# Patient Record
Sex: FEMALE | Race: WHITE | NOT HISPANIC OR LATINO | Employment: UNEMPLOYED | URBAN - METROPOLITAN AREA
[De-identification: names, ages, dates, MRNs, and addresses within clinical notes are randomized per-mention and may not be internally consistent; named-entity substitution may affect disease eponyms.]

---

## 2022-09-29 ENCOUNTER — TRANSFERRED RECORDS (OUTPATIENT)
Dept: HEALTH INFORMATION MANAGEMENT | Facility: CLINIC | Age: 52
End: 2022-09-29

## 2022-10-13 ENCOUNTER — TRANSFERRED RECORDS (OUTPATIENT)
Dept: HEALTH INFORMATION MANAGEMENT | Facility: CLINIC | Age: 52
End: 2022-10-13

## 2022-10-21 ENCOUNTER — TRANSFERRED RECORDS (OUTPATIENT)
Dept: HEALTH INFORMATION MANAGEMENT | Facility: CLINIC | Age: 52
End: 2022-10-21

## 2022-10-24 ENCOUNTER — TRANSFERRED RECORDS (OUTPATIENT)
Dept: HEALTH INFORMATION MANAGEMENT | Facility: CLINIC | Age: 52
End: 2022-10-24

## 2022-11-07 ENCOUNTER — TRANSFERRED RECORDS (OUTPATIENT)
Dept: HEALTH INFORMATION MANAGEMENT | Facility: CLINIC | Age: 52
End: 2022-11-07

## 2023-07-15 ENCOUNTER — HEALTH MAINTENANCE LETTER (OUTPATIENT)
Age: 53
End: 2023-07-15

## 2023-11-28 ENCOUNTER — TRANSFERRED RECORDS (OUTPATIENT)
Dept: HEALTH INFORMATION MANAGEMENT | Facility: CLINIC | Age: 53
End: 2023-11-28

## 2023-12-06 ENCOUNTER — TRANSFERRED RECORDS (OUTPATIENT)
Dept: HEALTH INFORMATION MANAGEMENT | Facility: CLINIC | Age: 53
End: 2023-12-06

## 2023-12-19 ENCOUNTER — TRANSFERRED RECORDS (OUTPATIENT)
Dept: HEALTH INFORMATION MANAGEMENT | Facility: CLINIC | Age: 53
End: 2023-12-19

## 2023-12-27 ENCOUNTER — TRANSCRIBE ORDERS (OUTPATIENT)
Dept: OTHER | Age: 53
End: 2023-12-27

## 2023-12-27 DIAGNOSIS — E55.9 VITAMIN D DEFICIENCY: ICD-10-CM

## 2023-12-27 DIAGNOSIS — D64.9 CHRONIC ANEMIA: Primary | ICD-10-CM

## 2023-12-29 ENCOUNTER — PATIENT OUTREACH (OUTPATIENT)
Dept: ONCOLOGY | Facility: CLINIC | Age: 53
End: 2023-12-29
Payer: COMMERCIAL

## 2023-12-29 NOTE — PROGRESS NOTES
Hematology referral reviewed for [facility], see below.    Referral reason: iron deficiency refractory to oral iron    Clinical question entered by referring provider or through order transcription: fax referral    Referral received via: Fax referral from [facility]    Current abnormal labs: Available in Media tab - referred by [facility]    Outreach: Call not placed to patient regarding referral.    Plan: Triage instructions updated and sent to NPS for completion.

## 2024-01-17 ENCOUNTER — VIRTUAL VISIT (OUTPATIENT)
Dept: ENDOCRINOLOGY | Facility: CLINIC | Age: 54
End: 2024-01-17
Payer: COMMERCIAL

## 2024-01-17 ENCOUNTER — TELEPHONE (OUTPATIENT)
Dept: ENDOCRINOLOGY | Facility: CLINIC | Age: 54
End: 2024-01-17

## 2024-01-17 VITALS — BODY MASS INDEX: 38.98 KG/M2 | HEIGHT: 63 IN | WEIGHT: 220 LBS

## 2024-01-17 DIAGNOSIS — Z90.3 STATUS POST SLEEVE GASTRECTOMY: ICD-10-CM

## 2024-01-17 DIAGNOSIS — E66.01 CLASS 2 SEVERE OBESITY WITH SERIOUS COMORBIDITY AND BODY MASS INDEX (BMI) OF 38.0 TO 38.9 IN ADULT, UNSPECIFIED OBESITY TYPE (H): Primary | ICD-10-CM

## 2024-01-17 DIAGNOSIS — E66.01 CLASS 2 SEVERE OBESITY WITH SERIOUS COMORBIDITY AND BODY MASS INDEX (BMI) OF 38.0 TO 38.9 IN ADULT, UNSPECIFIED OBESITY TYPE (H): ICD-10-CM

## 2024-01-17 DIAGNOSIS — E66.812 CLASS 2 SEVERE OBESITY WITH SERIOUS COMORBIDITY AND BODY MASS INDEX (BMI) OF 38.0 TO 38.9 IN ADULT, UNSPECIFIED OBESITY TYPE (H): Primary | ICD-10-CM

## 2024-01-17 DIAGNOSIS — Z71.3 NUTRITIONAL COUNSELING: Primary | ICD-10-CM

## 2024-01-17 DIAGNOSIS — R73.03 PREDIABETES: ICD-10-CM

## 2024-01-17 DIAGNOSIS — E66.812 CLASS 2 SEVERE OBESITY WITH SERIOUS COMORBIDITY AND BODY MASS INDEX (BMI) OF 38.0 TO 38.9 IN ADULT, UNSPECIFIED OBESITY TYPE (H): ICD-10-CM

## 2024-01-17 PROBLEM — R93.5 ABNORMAL ULTRASOUND OF ENDOMETRIUM: Status: ACTIVE | Noted: 2022-10-31

## 2024-01-17 PROBLEM — M18.12 ARTHRITIS OF CARPOMETACARPAL (CMC) JOINT OF LEFT THUMB: Status: ACTIVE | Noted: 2019-11-18

## 2024-01-17 PROBLEM — Z15.02 BRCA2 GENE MUTATION POSITIVE IN FEMALE: Status: ACTIVE | Noted: 2023-09-16

## 2024-01-17 PROBLEM — F43.23 ADJUSTMENT DISORDER WITH MIXED ANXIETY AND DEPRESSED MOOD: Status: ACTIVE | Noted: 2022-02-12

## 2024-01-17 PROBLEM — K44.9 HIATAL HERNIA: Status: ACTIVE | Noted: 2022-01-16

## 2024-01-17 PROBLEM — K80.00 GALLSTONES AND INFLAMMATION OF GALLBLADDER WITHOUT OBSTRUCTION: Status: ACTIVE | Noted: 2022-01-16

## 2024-01-17 PROBLEM — M72.0 DUPUYTREN'S DISEASE OF PALM OF RIGHT HAND: Status: ACTIVE | Noted: 2019-11-18

## 2024-01-17 PROBLEM — Z15.01 BRCA2 GENE MUTATION POSITIVE IN FEMALE: Status: ACTIVE | Noted: 2023-09-16

## 2024-01-17 PROBLEM — Z15.09 BRCA2 GENE MUTATION POSITIVE IN FEMALE: Status: ACTIVE | Noted: 2023-09-16

## 2024-01-17 PROCEDURE — 99205 OFFICE O/P NEW HI 60 MIN: CPT | Mod: 95

## 2024-01-17 PROCEDURE — 99417 PROLNG OP E/M EACH 15 MIN: CPT | Mod: 95

## 2024-01-17 PROCEDURE — 97802 MEDICAL NUTRITION INDIV IN: CPT | Mod: 95 | Performed by: DIETITIAN, REGISTERED

## 2024-01-17 PROCEDURE — 99207 PR NO CHARGE LOS: CPT | Mod: 95 | Performed by: DIETITIAN, REGISTERED

## 2024-01-17 RX ORDER — DEXTROAMPHETAMINE SACCHARATE, AMPHETAMINE ASPARTATE, DEXTROAMPHETAMINE SULFATE AND AMPHETAMINE SULFATE 2.5; 2.5; 2.5; 2.5 MG/1; MG/1; MG/1; MG/1
TABLET ORAL
COMMUNITY
Start: 2022-01-16

## 2024-01-17 RX ORDER — MULTIVITAMIN WITH IRON
1 TABLET ORAL DAILY
COMMUNITY

## 2024-01-17 RX ORDER — DESVENLAFAXINE 50 MG/1
100 TABLET, FILM COATED, EXTENDED RELEASE ORAL
COMMUNITY
End: 2024-01-17

## 2024-01-17 RX ORDER — FERROUS SULFATE 325(65) MG
325 TABLET, DELAYED RELEASE (ENTERIC COATED) ORAL
COMMUNITY
Start: 2022-12-13

## 2024-01-17 RX ORDER — DEXTROAMPHETAMINE SACCHARATE, AMPHETAMINE ASPARTATE, DEXTROAMPHETAMINE SULFATE AND AMPHETAMINE SULFATE 5; 5; 5; 5 MG/1; MG/1; MG/1; MG/1
10 TABLET ORAL
COMMUNITY
Start: 2022-10-17 | End: 2024-01-17

## 2024-01-17 RX ORDER — GABAPENTIN 100 MG/1
100 CAPSULE ORAL
COMMUNITY
Start: 2022-10-18 | End: 2024-01-17

## 2024-01-17 RX ORDER — DESVENLAFAXINE 100 MG/1
TABLET, EXTENDED RELEASE ORAL
COMMUNITY
Start: 2014-01-16

## 2024-01-17 RX ORDER — BUPROPION HYDROCHLORIDE 300 MG/1
300 TABLET ORAL DAILY
COMMUNITY
End: 2024-01-17

## 2024-01-17 RX ORDER — CETIRIZINE HYDROCHLORIDE 10 MG/1
TABLET ORAL
COMMUNITY
Start: 2004-01-16

## 2024-01-17 RX ORDER — CHOLECALCIFEROL (VITAMIN D3) 50 MCG
TABLET ORAL
COMMUNITY
End: 2024-01-17

## 2024-01-17 RX ORDER — BUPROPION HYDROCHLORIDE 100 MG/1
TABLET, EXTENDED RELEASE ORAL
COMMUNITY

## 2024-01-17 RX ORDER — ACETAMINOPHEN 500 MG
TABLET ORAL
COMMUNITY
Start: 2022-10-31

## 2024-01-17 RX ORDER — ESTRADIOL/NORETHINDRONE ACETATE TRANSDERMAL SYSTEM .05; .14 MG/D; MG/D
1 PATCH, EXTENDED RELEASE TRANSDERMAL
COMMUNITY
Start: 2022-09-16

## 2024-01-17 RX ORDER — HYDROXYZINE HYDROCHLORIDE 25 MG/1
TABLET, FILM COATED ORAL
COMMUNITY
Start: 2022-08-09 | End: 2024-01-17

## 2024-01-17 RX ORDER — VALACYCLOVIR HYDROCHLORIDE 500 MG/1
TABLET, FILM COATED ORAL
COMMUNITY
Start: 2022-08-26

## 2024-01-17 RX ORDER — VITAMIN B COMPLEX
TABLET ORAL
COMMUNITY
Start: 2024-01-16

## 2024-01-17 RX ORDER — BUPROPION HYDROCHLORIDE 100 MG/1
TABLET ORAL
COMMUNITY
Start: 2002-01-16 | End: 2024-01-17

## 2024-01-17 ASSESSMENT — PAIN SCALES - GENERAL: PAINLEVEL: NO PAIN (0)

## 2024-01-17 NOTE — PROGRESS NOTES
"Video-Visit Details    Type of service:  Video Visit    Video Start Time: 12:54 pm   Video End Time: 1:30 pm    Originating Location (pt. Location): Home    Distant Location (provider location):  Offsite (providers home)     Platform used for Video Visit: Semetric      Nutrition Assessment  Reason For Visit:  Angie Sandoval is a 53 year old female presents today for new re-establish nutrition visit. Pt with history of sleeve gastrectomy in 2011.    Referred by Corrine Nugent PA-C on 1/17/24.     Anthropometrics:  Lowest weight post op: 160 lbs  Weight 1/17/24: 220 lbs     Estimated body mass index is 38.97 kg/m  as calculated from the following:    Height as of this encounter: 1.6 m (5' 3\").    Weight as of this encounter: 99.8 kg (220 lb).    Current Vitamins/Minerals:   Vit D3 4,000 international unit(s)/day   Vitamin blend (did testing through them per pt) - has 315 mcg   Mg  Biotin  Calcium - needs to get re-filled  Ginkgo Biloba     Hx of iron def anemia - upcoming appt with hematologist.  Not able to see iron labs (labs done 12/20/23 through [facility] per pt)     Medications for weight loss:  Metformin - dose increased today with Corrine    Nutrition History:  Working on figuring out allergies - has had skin/blood tests.   Cannot tolerate eggs/ onions/garlic/strawberries/whey/soy generally speaking per pt - does almond milk. Can eat cheese.   Can tolerate chocolate within reason (pending on allergens in air)  Avoid tomatoes due to reflux.  Rice/pasta/bread do not sit well since surgery.    Pt with hx of sleeve gastrectomy in 2011. Re-establishing care today.  Concerns per pt \"Weight gain 40 pounds in 6 months & cant eliminate it, also developed pre-diabetes witn 5.7 A1C, considering gall bladder removal due to inflammed, hiatal hernia developed post surgery making gastruc reflux bad and on meds for that.\"    Pt goals: improve energy    Typically eats 3x/day  Example meals: packet of oatmeal with blueberries, soup, " 1/2 cheeseburger with handful of sweet potato fries, 2 slices veggie cauliflower pizza  Snacks - protein balls, herbal life protein shake    Was snacking on chips/sweets but working on cutting back (limited by braces). Has a sweet tooth per pt.    Can't sit down and eat full meal - takes about 60 min to eat 1/2 portions family eats.    Typically good at chewing - harder right now due to recent braces.    Hydration: sweetened ice latee from [employer], water, propel diet soda   - Gets maybe 1 water bottle/day currently (12-16 oz)   - Separates fluids from meal time (Feels she has to)    Alcohol: None    PA: very active in [place] (Works in [employer])    Nutrition Prescription:  Grams Protein: 50-60 (minimum)  Amount of Fluid: 48-64 oz    Nutrition Diagnosis  Obesity r/t long history of positive energy balance aeb BMI >30.    Intervention  Materials/Education provided on maintenance dietary guidelines after bariatric surgery, portion sizes, eating pace and chewing well, snacking, separation of beverages from meals, vitamin and mineral supplements after weight loss surgery, and protein needs. Discussed importance of physical activity. Patient demonstrates understanding. Provided pt with list of goals and RD contact information.    Expected Engagement: good    Goals:  Could consider Nicholas coffee concentrate as an alternative to latte   Recommend 20-30 g of protein/meal   Consider gym membership   Aim for 3-4 water bottles/day     Some protein ideas:   - Quinoa   - [place] products (oatmeal, waffles/pancakes)   - Seeds   - Nuts   - Nut butter   - Beans   - Protein shake     Nicholas coffee: [email or web address]     Resources     Why Take Supplements for Life after Weight Loss Surgery  [email or web address]     Supplements after Sleeve Gastrectomy  [email or web address]    Keeping Up Your Diet after Weight Loss  Surgery  [email or web address]    Preventing Low Blood Sugar after Weight Loss Surgery  [email or web address]     Preventing Dumping Syndrome after Weight Loss Surgery  [email or web address]     Follow-Up:  Friday, March 15th at 8:00 am    Time spent with patient: 36 minutes.  SHALINI Sharma, RD, LD

## 2024-01-17 NOTE — NURSING NOTE
"Chief Complaint   Patient presents with    Follow Up     New re-establish consultation for weight management.         Vitals:    01/17/24 1038   Weight: 220 lb   Height: 5' 3\"       Body mass index is 38.97 kg/m .      Kylee Waite, [facility]  Surgery Clinic                      "

## 2024-01-17 NOTE — PROGRESS NOTES
Virtual Visit Check-In    During this virtual visit the patient is located in [place], patient verifies this as the location during the entirety of this visit.     Angie is a 53 year old who is being evaluated via a billable video visit.      How would you like to obtain your AVS? MyChart  If the video visit is dropped, the invitation should be resent by: Text to cell phone: [phone]  Will anyone else be joining your video visit? No        Video-Visit Details    Type of service:  Video Visit     Originating Location (pt. Location): Home    Distant Location (provider location):  Off-site  Platform used for Video Visit: NatureWorks         Video Start Time:  10:40am  Video End Time: 11:50am    Kyele Zuniga EMT

## 2024-01-17 NOTE — TELEPHONE ENCOUNTER
Request sent to [facility] in [place] to all labs and Images done in December 2023 per Christina NICHOLS Request- SA 1/17/24

## 2024-01-17 NOTE — NURSING NOTE
Is the patient currently in the state of [place]? YES    Visit mode:VIDEO    If the visit is dropped, the patient can be reconnected by: TELEPHONE VISIT: Phone number:   Telephone Information:   Mobile [phone]       Will anyone else be joining the visit? NO  (If patient encounters technical issues they should call [phone])    How would you like to obtain your AVS? MyChart    Are changes needed to the allergy or medication list? No    Reason for visit: Consult    Kennedy CLEMONS

## 2024-01-17 NOTE — LETTER
2024       RE: Angie Sandoval  [address]  [place] [facility] [address]     Dear Colleague,    Thank you for referring your patient, Angie Sandoval, to the [facility] WEIGHT MANAGEMENT [facility]IC [place] at [facility]. Please see a copy of my visit note below.    New re-establish Care Post Bariatric Surgery Note    RE: Angie Sandoval  MR#: [id number]  : 1970  VISIT DATE: 2024      Dear No primary care provider on file.,    I had the pleasure of seeing your patient, Angie Sandoval, in my post-bariatric surgery assessment clinic.    Assessment & Plan  Problem List Items Addressed This Visit       Obesity, unspecified - Primary    Relevant Medications    amphetamine-dextroamphetamine (ADDERALL) 10 MG tablet    metFORMIN (GLUCOPHAGE) 500 MG tablet     Other Visit Diagnoses       Status post sleeve gastrectomy        Prediabetes        Relevant Medications    metFORMIN (GLUCOPHAGE) 500 MG tablet               123 minutes spent by me on the date of the encounter doing chart review, history and exam, documentation and further activities per the note    CHIEF COMPLAINT: Post-bariatric surgery follow-up. 13 years s/p sleeve gastrectomy with S[facility]chez Cohen at [facility].    HISTORY OF PRESENT ILLNESS:      2024    10:10 PM   Questions Regarding Prior Weight Loss Surgery Reviewed With Patient   I had the following weight loss procedure Sleeve Gastrectomy   What year was your surgery?    How has your weight changed since your last visit? I have gained weight   Do you currently have any of the following Heartburn, acid reflux, or GERD (acid reflux disease)?   Do you have any concerns today? Weight gain 40 pounds in 6 months & cant eliminate it, also developed pre-diabetes witn 5.7 A1C, considering gall bladder removal due to inflammed, hiatal hernia developed post surgery making gastruc reflux bad and on meds for that.   Overweight onset in 20s, was fluctuating  between 140 and 190. Further weight gain in 30s with pregnancies, after pregnancies weighed 236lbs. Underwent vertical sleeve gastrectomy in 2011, michael weight following surgery was 160, was very comfortable between 160 and 170 for 11 years. Last year gained 40 lbs months over 6 with extensive stress with daughter being hospitalized due to suicide attempt. Daughter is now doing better, still is experiencing stress with kids and ex-. Took gabapentin last year due to stress, was also diagnosed with iron deficiency anemia, has upcoming appointment with a hematologist . Iron labs are not available currently on chart (per patient labs were done 12/20/2023 through [facility]).  Has since discontinued gabapentin.     [facility] care through [facility] with Dr. Abdalla.     Has heart burn that is well managed symptomatically with 20mg omeprazole daily. Diagnosed with GERD and determined to be high risk for developing barrets esophagus, followed by [facility]. 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 Also has hiatal hernia.     Comorbidities associated with weight gain include GERD, prediabetes, low energy.       Regarding eating patterns and diet, she typically  eats 3 small meals and snacks throughout the day. Doesn't experience cravings. Will feel bloated after eating too much, but does not feel the same feeling of fullness since the getting the gastric sleeve.     Eats out/ gets take out every day, is working on reducing this. Has been doing this lately due to lack of time and motivation to cook for boyfriend 2 teenagers and an exchange student, her kids generally don't all want the same meal so getting take out is a convenient solution to this. Drinks sweetened ice latte from [employer] every morning, the rest of the day drinks diet soda, water. Doesn't drink alcohol.     Breakfast: bowl of oatmeal with blueberries. Sweetened ice latte from [employer],  would be open to switching to regular latte or capucino.    Lunch: soup   Dinner: 1/2 cheese burger, sweet potato fries   Snack: protein balls, herbalife protein shake,     Regarding activity, she is mostly sedentary during the winter. Cleans her house daily. In summer is busy with running pop-up garden centers and is on her feet throughout the day for this, found this to be great exercise. Is interested in joining a gym, used to love going to the gym, working out, lifting weights.        Past/ Current AOMs   Wellbutrin 100mg 3x daily, has been taking this medication for the last 20 years, has not seen weight loss from it.   Metformin 500mg started 2 weeks ago through primary care, tolerates this medication, maybe has noticed a small headache since starting it.      She is hesitant with starting a new weight loss medication today as she feels that she is still feeling appetite and hunger control with her gastric sleeve. She is open to increasing her metformin today, but otherwise would prefer to focus on adjusting diet and lifestyle prior to pursuing additional weight loss medication options.     Can consider zepbound or other GLP-1s (caution would be needed due to pancreatic duct obstruction), phentermine (no history of HTN, heart problems, caution needed due to anxiety), or adding naltrexone for synergistic effect with wellbutrin. Extra caution would be needed with topiramate due to her having a kidney stone as well as her concern for brain fog.    Plan  Increase metformin to 1g daily for blood sugar and assistance with weight management   Meet with hematologist as planned to evaluate for concerns regarding chronic anemia, management of this may help improve energy levels   Goals we discussed today: switching to a coffee or latte in with no added flavoring, increasing daily protein, goal is 20-30g per meal (60-90g per day), getting a gym membership   Labs ordered today: post bariatric surgery labs that were not  completed at [facility] (labs uploaded to patient's chart). 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  PHENTERMINE  No history of hypertension  No history of CVA   No history of cardiovascular disease   No history of cardiac arrhythmias   No history of glaucoma  Caution would be needed with this medication due to anxiety  .  TOPIRAMATE  No history of glaucoma   No issues with memory  No chronic kidney disease   Caution would be needed with this medication due to current kidney stone and concern for brain fog   .  NALTREXONE  No history of liver disease  No chronic pain   No current opioid use   May see synergistic effect when taking this alongside her wellbutrin  .            Weight History:      1/16/2024    10:10 PM   --   What is your highest lifetime weight? 236   What is your lowest weight since surgery? (In pounds) 160     Initial Weight (lbs): 220 lbs  Weight: 99.8 kg (220 lb)     Cumulative weight loss (lbs): 0  Weight Loss Percentage: 0%        1/16/2024    10:10 PM   Questions Regarding Co-Morbidities and Health Concerns Reviewed With Patient   Pre-diabetes Worsened   Diabetes II Never   High Blood Pressure Never   High cholesterol Never   Heartburn/Reflux Worsened   Sleep apnea Gone away   PCOS Never   Back pain Never   Joint pain Never   Lower leg swelling Worsened           1/16/2024    10:10 PM   Eating Habits   How many meals do you eat per day? 4   Do you snack between meals? Sometimes   How much food are you eating at  each meal? 1/2 cup to 1 cup   Are you able to separate your meals and liquids by at least 30 minutes? Sometimes   Are you able to avoid liquid calories? Sometimes           1/16/2024    10:10 PM   Exercise Questions Reviewed With Patient   How often do you exercise? 1 to 2 times per week   What is the duration of your exercise (in minutes)? 30 Minutes   What types of exercise do you do? walking    climbing stairs at work   What keeps you from being more active? I should be more active but I just have not gotten around to it    Too tired       Social History:      1/16/2024    10:10 PM   --   Are you smoking? No   Are you drinking alcohol? No       Medications:  Current Outpatient Medications   Medication    acetaminophen (TYLENOL) 500 MG tablet    amphetamine-dextroamphetamine (ADDERALL) 10 MG tablet    buPROPion (WELLBUTRIN SR) 100 MG 12 hr tablet    cetirizine (ZYRTEC) 10 MG tablet    desvenlafaxine (PRISTIQ) 100 MG 24 hr tablet    EPINEPHrine (SYMJEPI) 0.3 MG/0.3ML    estradiol-norethindrone (COMBIPATCH) 0.05-0.14 MG/DAY bi-weekly patch    ferrous sulfate (FE TABS) 325 (65 Fe) MG EC tablet    lipase-protease-amylase (CREON) 48945-68922-015029 units CPEP per EC capsule    magnesium 250 MG tablet    metFORMIN (GLUCOPHAGE) 500 MG tablet    omeprazole (PRILOSEC) 20 MG DR capsule    valACYclovir (VALTREX) 500 MG tablet    Vitamin D3 (CHOLECALCIFEROL) 25 mcg (1000 units) tablet     No current facility-administered medications for this visit.         1/16/2024    10:10 PM   --   Do you avoid NSAIDs such as (Ibuprofen, Aleve, Naproxen, Advil)? No       ROS:  GI:       1/16/2024    10:10 PM   --   Vomiting No   Diarrhea No   Constipation Yes   Swallowing trouble No   Abdominal pain Yes   Heartburn Yes     Skin:       1/16/2024    10:10 PM   BAR RBS ROS - SKIN   Rash in skin folds No     Psych:       1/16/2024    10:10 PM   --   Depression Yes   Anxiety Yes     Female Only:       1/16/2024    10:10 PM   BAR RBS ROS -   "  Female only Post-menopausal   Stress urinary incontinence No                No data to display                Anti-obesity medication ROS:    HEENT  Hx of glaucoma: No    Cardiovascular  CAD:No  HTN:No      Gastrointestinal  GERD:Yes  Constipation:Yes, with taking iron supplements has been improved with taking 250mg magnesium daily . Bowel movements daily, well formed.   Liver Dz:No  H/O Pancreatitis:No Has pancreatic duct blocked.     Psychiatric  Bipolar: No  Anxiety:Yes  Depression:Yes  History of alcohol/drug abuse: No  Hx of eating disorder:No    Endocrine  Personal or family hx of MTC or MEN2:No  Diabetes/prediabetes: Yes Last A1c 5.7    Neurologic:  Hx of seizures: No  Hx of migraines: Yes seldom, last migraine spring 2023. Gets them 1-2 times a year.   Memory Impairment: Yes Has seen brain fog with age.   CVA history: No        History of kidney stones: Yes patient reports seen incidentally on CT scan December 2023  Kidney disease: No  Current birth control: No    Taking Opioid/Narcotic: No    PHYSICAL EXAM:  Objective   Ht 1.6 m (5' 3\")   Wt 99.8 kg (220 lb)   BMI 38.97 kg/m           Vitals:  No vitals were obtained today due to virtual visit.    Physical Exam   GENERAL: alert and no distress  EYES: Eyes grossly normal to inspection.  No discharge or erythema, or obvious scleral/conjunctival abnormalities.  RESP: No audible wheeze, cough, or visible cyanosis.    SKIN: Visible skin clear. No significant rash, abnormal pigmentation or lesions.  NEURO: Cranial nerves grossly intact.  Mentation and speech appropriate for age.  PSYCH: Appropriate affect, tone, and pace of words        Sincerely,    Corrine Begum PA-C      Virtual Visit Check-In    During this virtual visit the patient is located in [place], patient verifies this as the location during the entirety of this visit.     Angie is a 53 year old who is being evaluated via a billable video visit.      How would you like to obtain your AVS? " Sydni  If the video visit is dropped, the invitation should be resent by: Text to cell phone: [phone]  Will anyone else be joining your video visit? No        Video-Visit Details    Type of service:  Video Visit     Originating Location (pt. Location): Home    Distant Location (provider location):  Off-site  Platform used for Video Visit: Gen         Video Start Time:  10:40am  Video End Time: 11:50am    Kylee Zuniga EMT

## 2024-01-17 NOTE — LETTER
"1/17/2024       RE: Angie Sandoval  [address]  [place] [address]     Dear Colleague,    Thank you for referring your patient, Angie Sandoval, to the [facility] WEIGHT MANAGEMENT CLINIC [place] at [facility]. Please see a copy of my visit note below.    Video-Visit Details    Type of service:  Video Visit    Video Start Time: 12:54 pm   Video End Time: 1:30 pm    Originating Location (pt. Location): Home    Distant Location (provider location):  Offsite (providers home)     Platform used for Video Visit: [email or web address]      Nutrition Assessment  Reason For Visit:  Angie Sandoval is a 53 year old female presents today for new re-establish nutrition visit. Pt with history of sleeve gastrectomy in 2011.    Referred by Corrine Nugent PA-C on 1/17/24.     Anthropometrics:  Lowest weight post op: 160 lbs  Weight 1/17/24: 220 lbs     Estimated body mass index is 38.97 kg/m  as calculated from the following:    Height as of this encounter: 1.6 m (5' 3\").    Weight as of this encounter: 99.8 kg (220 lb).    Current Vitamins/Minerals:   Vit D3 4,000 international unit(s)/day   Vitamin blend (did testing through them per pt) - has 315 mcg   Mg  Biotin  Calcium - needs to get re-filled  Ginkgo Biloba     Hx of iron def anemia - upcoming appt with hematologist.  Not able to see iron labs (labs done 12/20/23 through [facility] per pt)     Medications for weight loss:  Metformin - dose increased today with Corrine    Nutrition History:  Working on figuring out allergies - has had skin/blood tests.   Cannot tolerate eggs/ onions/garlic/strawberries/whey/soy generally speaking per pt - does almond milk. Can eat cheese.   Can tolerate chocolate within reason (pending on allergens in air)  Avoid tomatoes due to reflux.  Rice/pasta/bread do not sit well since surgery.    Pt with hx of sleeve gastrectomy in 2011. Re-establishing care today.  Concerns per pt \"Weight gain 40 pounds in 6 " "months & cant eliminate it, also developed pre-diabetes witn 5.7 A1C, considering gall bladder removal due to inflammed, hiatal hernia developed post surgery making gastruc reflux bad and on meds for that.\"    Pt goals: improve energy    Typically eats 3x/day  Example meals: packet of oatmeal with blueberries, soup, 1/2 cheeseburger with handful of sweet potato fries, 2 slices veggie cauliflower pizza  Snacks - protein balls, herbal life protein shake    Was snacking on chips/sweets but working on cutting back (limited by braces). Has a sweet tooth per pt.    Can't sit down and eat full meal - takes about 60 min to eat 1/2 portions family eats.    Typically good at chewing - harder right now due to recent braces.    Hydration: sweetened ice latee from [employer], water, propel diet soda   - Gets maybe 1 water bottle/day currently (12-16 oz)   - Separates fluids from meal time (Feels she has to)    Alcohol: None    PA: very active in [employer] (Works in [employer])    Nutrition Prescription:  Grams Protein: 50-60 (minimum)  Amount of Fluid: 48-64 oz    Nutrition Diagnosis  Obesity r/t long history of positive energy balance aeb BMI >30.    Intervention  Materials/Education provided on maintenance dietary guidelines after bariatric surgery, portion sizes, eating pace and chewing well, snacking, separation of beverages from meals, vitamin and mineral supplements after weight loss surgery, and protein needs. Discussed importance of physical activity. Patient demonstrates understanding. Provided pt with list of goals and RD contact information.    Expected Engagement: good    Goals:  Could consider Nicholas coffee concentrate as an alternative to latte   Recommend 20-30 g of protein/meal   Consider gym membership   Aim for 3-4 water bottles/day     Some protein ideas:   - Quinoa   - [place] products (oatmeal, waffles/pancakes)   - Seeds   - Nuts   - Nut butter   - Beans   - Protein shake     Nicholas coffee: " [email or web address]-vanilla-coffee-concentrate     Resources     Why Take Supplements for Life after Weight Loss Surgery  [email or web address]     Supplements after Sleeve Gastrectomy  [email or web address]    Keeping Up Your Diet after Weight Loss Surgery  [email or web address]    Preventing Low Blood Sugar after Weight Loss Surgery  [email or web address]     Preventing Dumping Syndrome after Weight Loss Surgery  [email or web address]     Follow-Up:  Friday, March 15th at 8:00 am    Time spent with patient: 36 minutes.  SHALINI Sharma, RD, LD

## 2024-01-17 NOTE — PROGRESS NOTES
New re-establish Care Post Bariatric Surgery Note    RE: Angie Sandoval  MR#: [id number]  : 1970  VISIT DATE: 2024      Dear No primary care provider on file.,    I had the pleasure of seeing your patient, Angie Sandoval, in my post-bariatric surgery assessment clinic.    Assessment & Plan   Problem List Items Addressed This Visit       Obesity, unspecified - Primary    Relevant Medications    amphetamine-dextroamphetamine (ADDERALL) 10 MG tablet    metFORMIN (GLUCOPHAGE) 500 MG tablet     Other Visit Diagnoses       Status post sleeve gastrectomy        Prediabetes        Relevant Medications    metFORMIN (GLUCOPHAGE) 500 MG tablet               123 minutes spent by me on the date of the encounter doing chart review, history and exam, documentation and further activities per the note    CHIEF COMPLAINT: Post-bariatric surgery follow-up. 13 years s/p sleeve gastrectomy with Sánchez Cohen at [facility].    HISTORY OF PRESENT ILLNESS:      2024    10:10 PM   Questions Regarding Prior Weight Loss Surgery Reviewed With Patient   I had the following weight loss procedure Sleeve Gastrectomy   What year was your surgery?    How has your weight changed since your last visit? I have gained weight   Do you currently have any of the following Heartburn, acid reflux, or GERD (acid reflux disease)?   Do you have any concerns today? Weight gain 40 pounds in 6 months & cant eliminate it, also developed pre-diabetes witn 5.7 A1C, considering gall bladder removal due to inflammed, hiatal hernia developed post surgery making gastruc reflux bad and on meds for that.   Overweight onset in 20s, was fluctuating between 140 and 190. Further weight gain in 30s with pregnancies, after pregnancies weighed 236lbs. Underwent vertical sleeve gastrectomy in , michael weight following surgery was 160, was very comfortable between 160 and 170 for 11 years. Last year gained 40 lbs months over 6 with extensive stress with daughter  being hospitalized due to suicide attempt. Daughter is now doing better, still is experiencing stress with kids and ex-. Took gabapentin last year due to stress, was also diagnosed with iron deficiency anemia, has upcoming appointment with a hematologist . Iron labs are not available currently on chart (per patient labs were done 12/20/2023 through [facility]).  Has since discontinued gabapentin.     Primary care through [facility] with Dr. Abdalla.     Has heart burn that is well managed symptomatically with 20mg omeprazole daily. Diagnosed with GERD and determined to be high risk for developing barrets esophagus, followed by [facility]. 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 Also has hiatal hernia.     Comorbidities associated with weight gain include GERD, prediabetes, low energy.       Regarding eating patterns and diet, she typically  eats 3 small meals and snacks throughout the day. Doesn't experience cravings. Will feel bloated after eating too much, but does not feel the same feeling of fullness since the getting the gastric sleeve.     Eats out/ gets take out every day, is working on reducing this. Has been doing this lately due to lack of time and motivation to cook for boyfriend 2 teenagers and an exchange student, her kids generally don't all want the same meal so getting take out is a convenient solution to this. Drinks sweetened ice latte from [employer] every morning, the rest of the day drinks diet soda, water. Doesn't drink alcohol.     Breakfast: bowl of oatmeal with blueberries. Sweetened ice latte from [employer], would be open to switching to regular latte or capucino.    Lunch: soup   Dinner: 1/2 cheese burger, sweet potato fries   Snack: protein balls, herbalife protein shake,     Regarding activity, she is mostly sedentary during the winter. Cleans her house daily. In summer is busy with running pop-up garden centers and  is on her feet throughout the day for this, found this to be great exercise. Is interested in joining a gym, used to love going to the gym, working out, lifting weights.        Past/ Current AOMs   Wellbutrin 100mg 3x daily, has been taking this medication for the last 20 years, has not seen weight loss from it.   Metformin 500mg started 2 weeks ago through primary care, tolerates this medication, maybe has noticed a small headache since starting it.      She is hesitant with starting a new weight loss medication today as she feels that she is still feeling appetite and hunger control with her gastric sleeve. She is open to increasing her metformin today, but otherwise would prefer to focus on adjusting diet and lifestyle prior to pursuing additional weight loss medication options.     Can consider zepbound or other GLP-1s (caution would be needed due to pancreatic duct obstruction), phentermine (no history of HTN, heart problems, caution needed due to anxiety), or adding naltrexone for synergistic effect with wellbutrin. Extra caution would be needed with topiramate due to her having a kidney stone as well as her concern for brain fog.    Plan  Increase metformin to 1g daily for blood sugar and assistance with weight management   Meet with hematologist as planned to evaluate for concerns regarding chronic anemia, management of this may help improve energy levels   Goals we discussed today: switching to a coffee or latte in with no added flavoring, increasing daily protein, goal is 20-30g per meal (60-90g per day), getting a gym membership   Labs ordered today: post bariatric surgery labs that were not completed at [facility] (labs uploaded to patient's chart). Please schedule these with any [place] lab   Follow up with Corrine in 3 months   Dietician appointment today    Other weight loss medications were not discussed in detail, can discuss and consider the following in the future:  ZEPBOUND  No history  of pancreatitis   No personal or family history of medullary thyroid carcinoma  No personal or family history of Multiple Endocrine Neoplasia Type 2  Caution would be needed with this medication due to  clogged pancreatic duct requiring pancreatic enzyme replacement    .  PHENTERMINE  No history of hypertension  No history of CVA   No history of cardiovascular disease   No history of cardiac arrhythmias   No history of glaucoma  Caution would be needed with this medication due to anxiety  .  TOPIRAMATE  No history of glaucoma   No issues with memory  No chronic kidney disease   Caution would be needed with this medication due to current kidney stone and concern for brain fog   .  NALTREXONE  No history of liver disease  No chronic pain   No current opioid use   May see synergistic effect when taking this alongside her wellbutrin  .            Weight History:      1/16/2024    10:10 PM   --   What is your highest lifetime weight? 236   What is your lowest weight since surgery? (In pounds) 160     Initial Weight (lbs): 220 lbs  Weight: 99.8 kg (220 lb)     Cumulative weight loss (lbs): 0  Weight Loss Percentage: 0%        1/16/2024    10:10 PM   Questions Regarding Co-Morbidities and Health Concerns Reviewed With Patient   Pre-diabetes Worsened   Diabetes II Never   High Blood Pressure Never   High cholesterol Never   Heartburn/Reflux Worsened   Sleep apnea Gone away   PCOS Never   Back pain Never   Joint pain Never   Lower leg swelling Worsened           1/16/2024    10:10 PM   Eating Habits   How many meals do you eat per day? 4   Do you snack between meals? Sometimes   How much food are you eating at each meal? 1/2 cup to 1 cup   Are you able to separate your meals and liquids by at least 30 minutes? Sometimes   Are you able to avoid liquid calories? Sometimes           1/16/2024    10:10 PM   Exercise Questions Reviewed With Patient   How often do you exercise? 1 to 2 times per week   What is the duration of your  exercise (in minutes)? 30 Minutes   What types of exercise do you do? walking    climbing stairs at work   What keeps you from being more active? I should be more active but I just have not gotten around to it    Too tired       Social History:      1/16/2024    10:10 PM   --   Are you smoking? No   Are you drinking alcohol? No       Medications:  Current Outpatient Medications   Medication    acetaminophen (TYLENOL) 500 MG tablet    amphetamine-dextroamphetamine (ADDERALL) 10 MG tablet    buPROPion (WELLBUTRIN SR) 100 MG 12 hr tablet    cetirizine (ZYRTEC) 10 MG tablet    desvenlafaxine (PRISTIQ) 100 MG 24 hr tablet    EPINEPHrine (SYMJEPI) 0.3 MG/0.3ML    estradiol-norethindrone (COMBIPATCH) 0.05-0.14 MG/DAY bi-weekly patch    ferrous sulfate (FE TABS) 325 (65 Fe) MG EC tablet    lipase-protease-amylase (CREON) 45612-40991-133445 units CPEP per EC capsule    magnesium 250 MG tablet    metFORMIN (GLUCOPHAGE) 500 MG tablet    omeprazole (PRILOSEC) 20 MG DR capsule    valACYclovir (VALTREX) 500 MG tablet    Vitamin D3 (CHOLECALCIFEROL) 25 mcg (1000 units) tablet     No current facility-administered medications for this visit.         1/16/2024    10:10 PM   --   Do you avoid NSAIDs such as (Ibuprofen, Aleve, Naproxen, Advil)? No       ROS:  GI:       1/16/2024    10:10 PM   --   Vomiting No   Diarrhea No   Constipation Yes   Swallowing trouble No   Abdominal pain Yes   Heartburn Yes     Skin:       1/16/2024    10:10 PM   BAR RBS ROS - SKIN   Rash in skin folds No     Psych:       1/16/2024    10:10 PM   --   Depression Yes   Anxiety Yes     Female Only:       1/16/2024    10:10 PM   BAR RBS ROS -    Female only Post-menopausal   Stress urinary incontinence No                No data to display                Anti-obesity medication ROS:    HEENT  Hx of glaucoma: No    Cardiovascular  CAD:No  HTN:No      Gastrointestinal  GERD:Yes  Constipation:Yes, with taking iron supplements has been improved with taking 250mg  "magnesium daily . Bowel movements daily, well formed.   Liver Dz:No  H/O Pancreatitis:No Has pancreatic duct blocked.     Psychiatric  Bipolar: No  Anxiety:Yes  Depression:Yes  History of alcohol/drug abuse: No  Hx of eating disorder:No    Endocrine  Personal or family hx of MTC or MEN2:No  Diabetes/prediabetes: Yes Last A1c 5.7    Neurologic:  Hx of seizures: No  Hx of migraines: Yes seldom, last migraine spring 2023. Gets them 1-2 times a year.   Memory Impairment: Yes Has seen brain fog with age.   CVA history: No        History of kidney stones: Yes patient reports seen incidentally on CT scan December 2023  Kidney disease: No  Current birth control: No    Taking Opioid/Narcotic: No    PHYSICAL EXAM:  Objective    Ht 1.6 m (5' 3\")   Wt 99.8 kg (220 lb)   BMI 38.97 kg/m           Vitals:  No vitals were obtained today due to virtual visit.    Physical Exam   GENERAL: alert and no distress  EYES: Eyes grossly normal to inspection.  No discharge or erythema, or obvious scleral/conjunctival abnormalities.  RESP: No audible wheeze, cough, or visible cyanosis.    SKIN: Visible skin clear. No significant rash, abnormal pigmentation or lesions.  NEURO: Cranial nerves grossly intact.  Mentation and speech appropriate for age.  PSYCH: Appropriate affect, tone, and pace of words        Sincerely,    Corrine Begum PA-C    "

## 2024-01-17 NOTE — PATIENT INSTRUCTIONS
Goals:  Could consider Nicholas coffee concentrate as an alternative to latte   Recommend 20-30 g of protein/meal   Consider gym membership   Aim for 3-4 water bottles/day     Some protein ideas:   - Quinoa   - [place] products (oatmeal, waffles/pancakes)   - Seeds   - Nuts   - Nut butter   - Beans   - Protein shake     Nicholas coffee: [email or web address]     Resources     Why Take Supplements for Life after Weight Loss Surgery  [email or web address]     Supplements after Sleeve Gastrectomy  [email or web address]    Keeping Up Your Diet after Weight Loss Surgery  [email or web address]    Preventing Low Blood Sugar after Weight Loss Surgery  [email or web address]     Preventing Dumping Syndrome after Weight Loss Surgery  [email or web address]     Follow-Up:  Friday, March 15th at 8:00 am    Lana Nichols (Duncan), SHALINI, RD, LD  Clinic #: [phone]

## 2024-01-18 NOTE — PATIENT INSTRUCTIONS
"Thank you for allowing us the privilege of caring for you. We hope we provided you with the excellent service you deserve.   Please let us know if there is anything else we can do for you so that we can be sure you are completely satisfied with your care experience.    To ensure the quality of our services you may be receiving a patient satisfaction survey from an independent patient satisfaction monitoring company.    The greatest compliment you can give is a \"Likely to Recommend\"    Your visit was with Corrine Begum PA-C today.    Instructions per today's visit:     Obdulio Sandoval, it was great to visit with you today.  Here is a review of our visit.  If our clinic scheduler is not able to reach you please call [phone] to schedule your next appointments.    Plan  Increase metformin to 1g daily for blood sugar and assistance with weight management   Meet with hematologist as planned to evaluate for concerns regarding chronic anemia, management of this may help improve energy levels   Goals we discussed today: switching to a coffee or latte in with no added flavoring, increasing daily protein, goal is 20-30g per meal (60-90g per day), getting a gym membership   Labs ordered today: post bariatric surgery labs that were not completed at [facility] (labs uploaded to patient's chart). Please schedule these with any [place] lab   Follow up with Corrine in 3 months   Dietician appointment today    Information about Video Visits with [facility] [employer]: video visit information  _________________________________________________________________________________________________________________________________________________________  If you are asked by your clinic team to have your blood pressure checked:  [facility] do offer several locations for blood pressure checks. Please follow the below link to schedule an appointment. Scheduling an appointment at the pharmacy for a blood pressure check is now " preferred.    Appointment Plus ([email or web address])  _________________________________________________________________________________________________________________________________________________________  Important contact and scheduling information:  Please call our contact center at [phone] to schedule your next appointments.  To find a lab location near you, please call [phone].  For any nursing questions or concerns call Rebecca Guzman LPN at [phone] or Sherine Trevino RN at [phone]  Please call during clinic hours Monday through Friday 8:00a - 4:00p if you have questions or you can contact us via [email or web address] at anytime and we will reply during clinic hours.    Lab results will be communicated through My Chart or letter (if My Chart not used). Please call the clinic if you have not received communication after 1 week or if you have any questions.?  Clinic Fax: [phone]    _________________________________________________________________________________________________________________________________________________________  Meal Replacement Products:    Here is the link to our new e-store where you can purchase our meal replacement products    [facility] E-Store  [email or web address]    The one week starter kit is a great way to sample a variety of products and see what works for you.    If you want more information about the product go to: [employer]    If you are an employee or [facility] or [facility] please contact your care team for a 10% estore discount    Free Shipping for orders over $75     Benefits of meal replacements products:    Portion and calorie control  Improved nutrition  Structured eating  Simplified food choices  Avoid contact with trigger  foods  _________________________________________________________________________________________________________________________________________________________  Interested in working with a health ?  Health coaches work with you to improve your overall health and wellbeing.  They look at the whole person, and may involve discussion of different areas of life, including, but not limited to the four pillars of health (sleep, exercise, nutrition, and stress management). Discuss with your care team if you would like to start working a health .  Health Coaching-3 Pack: Schedule by calling [phone]    $99 for three health coaching visits    Visits may be done in person or via phone    Coaching is a partnership between the  and the client; Coaches do not prescribe or diagnose    Coaching helps inspire the client to reach his/her personal goals   _________________________________________________________________________________________________________________________________________________________  24 Week Healthy Lifestyle Plan:    Our mission in the 24-week Healthy Lifestyle Plan is to provide you with individualized care by giving you the tools, education and support you need to lose weight and maintain a healthy lifestyle. In your 24-week journey, you ll be supported by a dedicated weight loss team that includes registered dietitians, medical weight management providers, health coaches, and nurses -- all with special expertise in weight loss -- to help you every step of the way.     Monthly meetings with your registered dietician or medical weight management provider help to review your progress, update your care plan, and make any adjustments needed to ensure success. Between these visits, weekly and bi-weekly health  visits will help you focus on the four pillars of weight loss -- stress, sleep, nutrition, and exercise -- and how you can best adapt each to achieve sustainable weight loss  results.    In addition, you will be given exclusive access to online wellbeing classes through [employer].  Your initial visit will be with a medical weight management provider who will help to understand your weight loss goals and ensure this program is the right fit for you. Please let our team know if you are interested in the 24 week plan by sending a message to your care team or calling [phone] to schedule.  _________________________________________________________________________________________________________________________________________________________  __________  [facility] Get Moving Program  Our team of physical therapists is trained to help you understand and take control of your condition. They will perform a thorough evaluation to determine your ability for activity and develop a customized plan to fit your goals and physical ability.  Scheduling: Unsure if the Get Moving program is right for you? Discuss the program with your medical provider or diabetes educator. You can also call us at [phone] to ask questions or schedule an appointment.   [facility] Get Moving Program  ____________________________________________________________________________________________________________________________________________________________________________   [place] Diabetes Prevention Program (DPP)  If you have prediabetes and Medicare please contact us via [employer] to learn more about the Diabetes Prevention Program (DPP)  Program Details:   [facility] offers the year-long Diabetes Prevention Program (DPP). The program helps you to make lifestyle changes that prevent or delay type 2 diabetes by supporting healthy eating, increased physical activity, stress reduction and use of coping skills.   On average, previous [facility] DPP cohorts have lost and maintained at least 5% of their starting weight throughout the program and averaged more than 150 minutes of physical  activity per week.  Participants meet weekly for one-hour group sessions over sixteen weeks, every other week for the next 8 weeks, and monthly for the last six months.   A year-long maintenance program is also available for participants who complete the first year.   Location & Cost:   During the COVID-19 Public Health Emergency, the program is offered virtually. When in-person classes can resume, they will be held at [facility].  For people with Medicare, the program is covered in full. A self-pay option will also be available for those with non-Medicare insurance plans.   ______________________________________________________________________________________________________________________________________________________________________________________________________________________________    To work with a Behavioral Health Psychologist:    Call to schedule:    Gene Gaston - [phone]  Dayanna Mendez - [phone]  Meme Arcos - [phone]  Lelia Pozo - [phone]   Ceci De La Cruz PhD (cannot accept Medicare) [phone]        Thank you,   [facility] Comprehensive Weight Management Team

## 2024-01-22 ENCOUNTER — TELEPHONE (OUTPATIENT)
Dept: ENDOCRINOLOGY | Facility: CLINIC | Age: 54
End: 2024-01-22
Payer: COMMERCIAL

## 2024-01-22 NOTE — TELEPHONE ENCOUNTER
General Call    Contacts         Type Contact Phone/Fax    01/22/2024 08:21 AM CST Phone (Incoming) [id number] [address] [facility] [address] (Pharmacy) [phone]          Reason for Call:  Metformin question    What are your questions or concerns:  pharmacy would like a call back about Metformin  [phone]

## 2024-02-13 NOTE — TELEPHONE ENCOUNTER
RECORDS STATUS - ALL OTHER DIAGNOSIS      RECORDS RECEIVED FROM: Epic   DATE RECEIVED:    NOTES STATUS DETAILS   OFFICE NOTE from referring provider External: [facility] 12/6/23: Shannon Juarez PA-C   MEDICATION LIST External: [facility]    LABS     ANYTHING RELATED TO DIAGNOSIS CE- NM Most recent 2/16/24

## 2024-02-22 ENCOUNTER — PRE VISIT (OUTPATIENT)
Dept: ONCOLOGY | Facility: CLINIC | Age: 54
End: 2024-02-22
Payer: COMMERCIAL

## 2025-03-30 ENCOUNTER — HEALTH MAINTENANCE LETTER (OUTPATIENT)
Age: 55
End: 2025-03-30